# Patient Record
Sex: MALE | Race: WHITE | NOT HISPANIC OR LATINO | URBAN - METROPOLITAN AREA
[De-identification: names, ages, dates, MRNs, and addresses within clinical notes are randomized per-mention and may not be internally consistent; named-entity substitution may affect disease eponyms.]

---
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
## 2021-05-26 ASSESSMENT — PATIENT HEALTH QUESTIONNAIRE - PHQ9: SUM OF ALL RESPONSES TO PHQ QUESTIONS 1-9: 7

## 2021-05-27 ASSESSMENT — PATIENT HEALTH QUESTIONNAIRE - PHQ9: SUM OF ALL RESPONSES TO PHQ QUESTIONS 1-9: 1

## 2021-05-27 NOTE — TELEPHONE ENCOUNTER
RN cannot approve Refill Request    RN can NOT refill this medication med is not covered by policy/route to provider.       Dominique Burleson, [facility] Triage/Med Refill 4/8/2019    Requested Prescriptions   Pending Prescriptions Disp Refills     traZODone (DESYREL) 50 MG tablet [Pharmacy Med Name: TRAZODONE TAB 50MG] 90 tablet 1     Sig: TAKE 1 TABLET AT BEDTIME       Tricyclics/Misc Antidepressant/Antianxiety Meds Refill Protocol Failed - 4/6/2019  7:13 AM        Failed - Age 21 and younger route to prescribing provider     Last office visit with prescriber/PCP: 10/23/2018 Deanne Gao MD OR same dept: 10/23/2018 Deanne Gao MD OR same specialty: 10/23/2018 Deanne Gao MD  Last physical: 7/14/2016 Last MTM visit: Visit date not found   Next visit within 3 mo: Visit date not found  Next physical within 3 mo: Visit date not found  Prescriber OR PCP: Deanne Gao MD  Last diagnosis associated with med order: 1. Insomnia, unspecified type  - traZODone (DESYREL) 50 MG tablet [Pharmacy Med Name: TRAZODONE TAB 50MG]; TAKE 1 TABLET AT BEDTIME  Dispense: 90 tablet; Refill: 1    If protocol passes may refill for 12 months if within 3 months of last provider visit (or a total of 15 months).

## 2021-05-28 NOTE — TELEPHONE ENCOUNTER
This dose was increased from 2 mg to 3 mg at his last visit and a new prescription was sent at that time.

## 2021-05-28 NOTE — TELEPHONE ENCOUNTER
RN cannot approve Refill Request    RN can NOT refill this medication med is not covered by policy/route to provider. Last office visit: 4/23/2019 Deanne Gao MD Last Physical: 7/14/2016 Last MTM visit: Visit date not found Last visit same specialty: 4/23/2019 Deanne aGo MD.  Next visit within 3 mo: Visit date not found  Next physical within 3 mo: Visit date not found      Cortney Oshea, [facility] Triage/Med Refill 5/15/2019    Requested Prescriptions   Pending Prescriptions Disp Refills     guanFACINE 2 mg Tb24 [Pharmacy Med Name: GUANFACIN ER TAB 2MG] 90 tablet 1     Sig: TAKE 1 TABLET DAILY       There is no refill protocol information for this order

## 2021-05-28 NOTE — PROGRESS NOTES
"Name: Forrest Kapadia  Age: 18 y.o.  Gender: male  : 2000  Date of Encounter: 2019    ASSESSMENT/PLAN:  1. Attention deficit hyperactivity disorder (ADHD), combined type  - guanFACINE (INTUNIV) 3 mg Tb24 tablet; Take 1 tablet (3 mg total) by mouth daily.  Dispense: 90 tablet; Refill: 1  - lisdexamfetamine (VYVANSE) 70 MG capsule; Take 1 capsule (70 mg total) by mouth every morning.  Dispense: 30 capsule; Refill: 0    Will increase vyvanse to 70 mg daily and guanfacine ER to 3 mg daily   refills sent (3 months of vyvanse to local pharmacy, 90 days with refill of guanfacine to mail order)  Okay to take trazodone as needed - or discontinue completely  Med check within 6 months    2. Hand injury, right, initial encounter  Suspect soft tissue injury  Discussed x-ray - return if not improving    3. Primary hypertriglyceridemia  Lipid panel improved but still abnormal  [facility] - will await reply        CC: med check, hand injury, followup labs    HPI:  Forrest Kaapdia is a 18 y.o.  male who presents to the clinic for an ADHD medication check. He is accompanied by his grandfather.      ADHD: He doses his vyvanse 60 mg and guanfacine ER 2 mg in the morning. He has not been dosing his afternoon adderall IR 5 mg dose because he has not had a lot of homework. He is in 11th grade. His grades have been declining this trimester. He is failing 3 of his classes and is passing 1 class. He will have to attend summer school if he fails a majority of his classes this semester. He recently had a math test that he did not have time to finish, which caused his grade to drop from an A to an F. He notes that some of his assignments get lost even though he turns them in. He has \"credit recovery\" time after school. He notes that he often finishes his homework at school so that he does not have to do it at home. He struggles with motivation. His grandparents monitor his grades closely online. He is " interested in attending [employer] after high school.     Sleep: He typically does not dose his trazodone. He takes the trazodone if he is not tired when he needs to go to bed. His grandfather notes that he has been sleeping a lot recently. He recently slept for 14 hours without trazodone dosing. He is currently restricted from screens until his grades improve. He is allowed to read before bed.     Hand injury: He slipped on a flight of stairs on 4/18. He hit his right hand on the stairs when he tried to break his fall. He endorses significant swelling immediately after the injury. He localizes the pain to the right fourth knuckle. He is able to use the hand fully. He has been able to work at the [employer] shop despite the injury.      Weight: He takes fish oil and vitamin D supplements. He saw a nutritionist [facility] in [facility]. He does still have a book with some resources from that visit. He drinks one soda every 2-3 days. He eats dinner late at night. Per his grandfather, he returns to the kitchen 5 more times throughout the night.     [place] thinks he spends too long on the toilet. Forrest states he stays in there to have some alone time. He did have 2 bouts of loose stool after eating sushi but usually tolerates that fine. He denies issues with constipation.    The family plans to move next summer to be closer to Forrest's parents.     ROS:  Gen: Positive for fatigue.  MS: Positive for right hand injury. Ankle injury this winter which resolved.  See pertinent positives in the HPI.     PHQ-9 Results:   Little interest or pleasure in doing things: Several days  Feeling down, depressed, or hopeless: Not at all  Trouble falling or staying asleep, or sleeping too much: Several days  Feeling tired or having little energy: More than half the days  Poor appetite or overeating: Several days  Feeling bad about yourself - or that you are a failure or have let yourself or your family down:  "Several days  Trouble concentrating on things, such as reading the newspaper or watching television: Not at all  Moving or speaking so slowly that other people could have noticed. Or the opposite - being so fidgety or restless that you have been moving around a lot more than usual: Not at all  Thoughts that you would be better off dead, or of hurting yourself in some way: Not at all  PHQ-9 Total Score: 6  If you checked off any problems, how difficult have these problems made it for you to do your work, take care of things at home, or get along with other people?: Somewhat difficult  Increased from 4 at his 10/19/18 visit    Past Med / Surg History:  Past Medical History:   Diagnosis Date     Chicken pox      Depression with anxiety 11/8/2016     Fracture     collar bone and finger     Osgood-Schlatter's disease 2014    bilaterally      Osgood-Schlatter's disease of both knees 11/8/2016     Shin splints 2016    bilaterally     Past Surgical History:   Procedure Laterality Date     NO PAST SURGERIES       Fam / Soc History:  Social: He works at a [employer] shop.  Family History   Problem Relation Age of Onset     Allergy (severe) Mother         walnut     Carpal tunnel syndrome Maternal Grandfather      Social History     Social History Narrative    Lives with maternal grandparents. They are his official guardian since February 2016.    His mother is a LPN and his step father is in retail. He has one brother and one sister.       Objective:  Vitals: /72   Pulse 106   Ht 5' 6\" (1.676 m)   Wt (!) 268 lb (121.6 kg)   BMI 43.26 kg/m    Wt Readings from Last 3 Encounters:   10/23/18 (!) 268 lb 1.6 oz (121.6 kg) (>99 %, Z= 2.79)*   04/24/18 (!) 264 lb 4.8 oz (119.9 kg) (>99 %, Z= 2.83)*   10/19/17 (!) 248 lb 14.4 oz (112.9 kg) (>99 %, Z= 2.74)*     * Growth percentiles are based on [facility] (Boys, 2-20 Years) data.     PHYSICAL EXAM:  Gen: Alert, well appearing  Eyes: Conjunctivae clear bilaterally.   Heart: Regular rate " and rhythm; normal S1 and S2; no murmurs, gallops, or rubs.  Lungs: Unlabored respirations; clear breath sounds.  Musculoskeletal: Right hand with bruising at the third MCP joint, mild tenderness with palpation near the fourth MCP joint, normal ROM and strength in right hand and fingers.  Skin: Inflammatory acne on face.   Neuro: Oriented.  Appropriate for age.  Psychiatric: Appropriate affect     Pertinent results / imaging:  Results for orders placed or performed in visit on 04/19/19   Lipid Profile   Result Value Ref Range    Triglycerides 185 (H) <=89 mg/dL    Cholesterol 166 <=169 mg/dL    LDL Calculated 104 <=109 mg/dL    HDL Cholesterol 25 (L) >45 mg/dL    Patient Fasting > 8hrs? Yes    Glucose   Result Value Ref Range    Glucose 89 70 - 125 mg/dL    Patient Fasting > 8hrs? Yes    ALT (SGPT)   Result Value Ref Range    ALT 41 0 - 45 U/L   Glycosylated Hemoglobin A1c   Result Value Ref Range    Hemoglobin A1c 5.3 3.5 - 6.0 %   Vitamin D, Total (25-Hydroxy)   Result Value Ref Range    Vitamin D, Total (25-Hydroxy) 35.6 30.0 - 80.0 ng/mL       DATA REVIEWED:  Additional History from Old Records Summarized (2): Reviewed 1/18/19 note regarding ankle injury.   Decision to Obtain Records (1): None  Radiology Tests Summarized or Ordered (1): Reviewed 1/18/19 ankle x-ray, which was normal.   Labs Reviewed or Ordered (1): Reviewed 4/19/19 labs.   Medicine Test Summarized or Ordered (1): Administered PHQ-9 today.   Independent Review of EKG, X-RAY, or RAPID STREP (2 each): None    The visit lasted a total of 32 minutes face to face with the patient. Over 50% of the time was spent counseling and educating the patient about his ADHD medication.    I, Amber Cardona, am scribing for and in the presence of, Dr. Deanne Gao.    I, Dr. Gao, personally performed the services described in this documentation, as scribed by Amber Cardona in my presence, and it is both accurate and complete.    Total Data Points: 5.      Deanne Gao MD  4/23/2019

## 2021-05-30 VITALS — BODY MASS INDEX: 40.65 KG/M2 | WEIGHT: 244 LBS | HEIGHT: 65 IN

## 2021-05-30 VITALS — WEIGHT: 238 LBS | HEIGHT: 65 IN | BODY MASS INDEX: 39.65 KG/M2

## 2021-05-30 NOTE — TELEPHONE ENCOUNTER
RN cannot approve Refill Request    RN can NOT refill this medication med is not covered by policy/route to provider. Last office visit: 4/23/2019 Deanne Gao MD Last Physical: 7/14/2016 Last MTM visit: Visit date not found Last visit same specialty: 4/23/2019 Deanne Gao MD.  Next visit within 3 mo: Visit date not found  Next physical within 3 mo: Visit date not found      Nory Parker, [facility] Triage/Med Refill 7/21/2019    Requested Prescriptions   Pending Prescriptions Disp Refills     lisdexamfetamine (VYVANSE) 70 MG capsule 30 capsule 0     Sig: Take 1 capsule (70 mg total) by mouth every morning.       There is no refill protocol information for this order

## 2021-05-31 VITALS — HEIGHT: 67 IN | WEIGHT: 248.9 LBS | BODY MASS INDEX: 39.07 KG/M2

## 2021-05-31 VITALS — WEIGHT: 250.1 LBS

## 2021-05-31 VITALS — WEIGHT: 249 LBS

## 2021-06-01 VITALS — BODY MASS INDEX: 42.47 KG/M2 | HEIGHT: 66 IN | WEIGHT: 264.3 LBS

## 2021-06-02 VITALS — BODY MASS INDEX: 43.07 KG/M2 | HEIGHT: 66 IN | WEIGHT: 268 LBS

## 2021-06-02 VITALS — HEIGHT: 66 IN | BODY MASS INDEX: 43.09 KG/M2 | WEIGHT: 268.1 LBS

## 2021-06-02 NOTE — TELEPHONE ENCOUNTER
RN cannot approve Refill Request    RN can NOT refill this medication med is not covered by policy/route to provider. Last office visit: 4/23/2019 Deanne Gao MD Last Physical: 7/14/2016 Last MTM visit: Visit date not found Last visit same specialty: 4/23/2019 Deanne Gao MD.  Next visit within 3 mo: Visit date not found  Next physical within 3 mo: Visit date not found      Nory Parker, [facility] Triage/Med Refill 10/20/2019    Requested Prescriptions   Pending Prescriptions Disp Refills     lisdexamfetamine (VYVANSE) 70 MG capsule 30 capsule 0     Sig: Take 1 capsule (70 mg total) by mouth every morning.       There is no refill protocol information for this order

## 2021-06-02 NOTE — PATIENT INSTRUCTIONS - HE
Vyvanse refills - 11/20, 12/20 and 1/19 for next fills  30 days of Adderall IR 5 mg sent to mail order   Guanfacine sent to mail order   Call/message when refills needed    Med check within 6 months   No

## 2021-06-02 NOTE — PROGRESS NOTES
ASSESSMENT:  1. Attention deficit hyperactivity disorder (ADHD), combined type  - lisdexamfetamine (VYVANSE) 70 MG capsule; Take 1 capsule (70 mg total) by mouth every morning.  Dispense: 30 capsule; Refill: 0  - guanFACINE (INTUNIV) 3 mg Tb24 tablet; TAKE 1 TABLET DAILY  Dispense: 90 tablet; Refill: 0  - dextroamphetamine-amphetamine (ADDERALL) 5 mg Tab tablet; Take 1 tablet by mouth daily in mid afternoon as needed  Dispense: 30 tablet; Refill: 0  - med check within 6 months    3. Depressed mood  - reviewed options of therapy/medication  - Forrest is not interested in further therapy - said he would return if symptoms worsening      PLAN:  Patient Instructions   Vyvanse refills - 11/20, 12/20 and 1/19 for next fills  30 days of Adderall IR 5 mg sent to mail order   Guanfacine sent to mail order   Call/message when refills needed    Med check within 6 months          CHIEF COMPLAINT:  Chief Complaint   Patient presents with     ADHD     vyvanse - [facility] / all other medications mail order       HISTORY OF PRESENT ILLNESS:  Forrest is a 19 y.o. male presenting to the clinic today for a medication check-up. He was last seen in clinic on 04/23/2019.    Medication: When he was last seen in clinic 6 months ago, he was not doing well in school.   He failed a few classes of high school and took summer school to make up for them. We increased his Vyvnase to 70 mg from 60 mg  and increased his Guanfacine ER to 3 mg from 2 mg daily. He complains of no side effects or issues with this increase in medication. He has not had his Adderall IR 5 mg prescription filled in [facility]. He would like to take this again for after school homework. This year he is doing better in school. He is in 12th grade. He is scheduled to graduate on track.     He works at a [employer] place about 25 hours a week. He saves his money. His grandparents want to move, but he said he does not want to move. He wants to live by [place]. He wants to  "go to [employer].     He does not take the Trazodone for sleep. He no longer takes anything for sleep.     Depression: He has cut on purpose  in the past. He uses this for a relief of stress and anxiety. He feels his mood is \"okay\". He has had a little depression lately with his past situations. He says, he rarely has issues with new stuff and problems. He said 1 in 100 times his bad thoughts are about new issues. It is mostly old stuff that comes up again. He has mood swings he has always had, but they have been more often lately. He picks at his skin when he is anxious. He has never been on a medication for mood. He feels he can cope with his mood \"fine\". He does not want to try a therapist. He was in therapy for years including group home for 3 year. He feels he has social support at school or work.   Some of his friends vape, but he does not. He would like to continue to try to cope on his own. He will come in again if he feels it worsens or he can no longer handle his mood on his own.     Little interest or pleasure in doing things: More than half the days  Feeling down, depressed, or hopeless: More than half the days  Trouble falling or staying asleep, or sleeping too much: Not at all  Feeling tired or having little energy: Not at all  Poor appetite or overeating: Not at all  Feeling bad about yourself - or that you are a failure or have let yourself or your family down: Several days  Trouble concentrating on things, such as reading the newspaper or watching television: Not at all  Moving or speaking so slowly that other people could have noticed. Or the opposite - being so fidgety or restless that you have been moving around a lot more than usual: Several days  Thoughts that you would be better off dead, or of hurting yourself in some way: Several days  PHQ-9 Total Score: 7 - increased from 6      Past Medical History:   Diagnosis Date     Chicken pox      Depression with anxiety 11/8/2016     Fracture  " "   collar bone and finger     Osgood-Schlatter's disease 2014    bilaterally      Osgood-Schlatter's disease of both knees 11/8/2016     Shin splints 2016    bilaterally       Family History   Problem Relation Age of Onset     Allergy (severe) Mother         walnut     Carpal tunnel syndrome Maternal Grandfather        Past Surgical History:   Procedure Laterality Date     NO PAST SURGERIES             TOBACCO USE:  Social History     Tobacco Use   Smoking Status Never Smoker   Smokeless Tobacco Never Used     VITALS:  Vitals:    10/28/19 1600   BP: 116/78   Patient Site: Right Arm   Patient Position: Sitting   Cuff Size: Adult Large   Pulse: 93   SpO2: 97%   Weight: (!) 270 lb 9.6 oz (122.7 kg)   Height: 5' 6.14\" (1.68 m)     Wt Readings from Last 3 Encounters:   10/28/19 (!) 270 lb 9.6 oz (122.7 kg) (>99 %, Z= 2.72)*   04/23/19 (!) 268 lb (121.6 kg) (>99 %, Z= 2.72)*   10/23/18 (!) 268 lb 1.6 oz (121.6 kg) (>99 %, Z= 2.79)*     * Growth percentiles are based on CDC (Boys, 2-20 Years) data.     Body mass index is 43.49 kg/m .    PHYSICAL EXAM:  General: Alert, no acute distress. obese  Eyes: PERRL, EOMI, Conjunctivae clear.  Lungs: Clear to auscultation bilaterally. No wheezes, rhonchi, or rales. No prolongation of expiratory phase. No tachypnea, retractions, or increased work of breathing.  Cardiac: Regular rate and rhythm, no murmur audible..  Skin: Mild Inflammatory acne on face. Excoriated papules on arms. Linear cut scar on dorsal of hand.    ADDITIONAL HISTORY SUMMARIZED (2): None.  DECISION TO OBTAIN EXTRA INFORMATION (1): None.   RADIOLOGY TESTS (1): None.  LABS (1): reviewed last lipid panel.  MEDICINE TESTS (1): PHQ-9  INDEPENDENT REVIEW (2 each): None.     QUALITY MEASURES:  The following are part of a depression follow up plan for the patient:  patient follow-up to return when and if necessary    The visit lasted a total of 22 minutes that I spent face to face with the patient. Over 50% of the time was " spent counseling and educating the patient about ADHD/mood    I, Luisana Bucio, am scribing for and in the presence of, Dr. Gao.    I, Dr. Gao, personally performed the services described in this documentation, as scribed by Luisana Bucio in my presence, and it is both accurate and complete.    MEDICATIONS:  Current Outpatient Medications   Medication Sig Dispense Refill     dextrin (FIBER, DEXTRIN,) 3 gram/3.5 gram Powd Take 1 capsule by mouth.       guanFACINE (INTUNIV) 3 mg Tb24 tablet TAKE 1 TABLET DAILY 90 tablet 0     lisdexamfetamine (VYVANSE) 70 MG capsule Take 1 capsule (70 mg total) by mouth every morning. 30 capsule 0     omega 3-dha-epa-fish oil (FISH OIL) 360-1,200 mg CpDR Take 1 capsule by mouth.       dextroamphetamine-amphetamine (ADDERALL) 5 mg Tab tablet Take 1 tablet by mouth daily in mid afternoon as needed 90 tablet 0     No current facility-administered medications for this visit.        Total data points:2

## 2021-06-02 NOTE — TELEPHONE ENCOUNTER
RN cannot approve Refill Request    RN can NOT refill this medication med is not covered by policy/route to provider. Last office visit: 4/23/2019 Deanne Gao MD Last Physical: 7/14/2016 Last MTM visit: Visit date not found Last visit same specialty: 4/23/2019 Deanne Gao MD.  Next visit within 3 mo: Visit date not found  Next physical within 3 mo: Visit date not found      Kim Devries, [facility] Triage/Med Refill 10/9/2019    Requested Prescriptions   Pending Prescriptions Disp Refills     guanFACINE (INTUNIV) 3 mg Tb24 tablet [Pharmacy Med Name: GUANFACIN ER TAB 3MG] 90 tablet 1     Sig: TAKE 1 TABLET DAILY       There is no refill protocol information for this order

## 2021-06-03 VITALS
OXYGEN SATURATION: 97 % | DIASTOLIC BLOOD PRESSURE: 80 MMHG | TEMPERATURE: 97 F | SYSTOLIC BLOOD PRESSURE: 120 MMHG | WEIGHT: 274 LBS | RESPIRATION RATE: 19 BRPM | HEART RATE: 94 BPM | BODY MASS INDEX: 44.04 KG/M2

## 2021-06-03 VITALS
HEART RATE: 93 BPM | DIASTOLIC BLOOD PRESSURE: 78 MMHG | HEIGHT: 66 IN | BODY MASS INDEX: 43.49 KG/M2 | SYSTOLIC BLOOD PRESSURE: 116 MMHG | WEIGHT: 270.6 LBS | OXYGEN SATURATION: 97 %

## 2021-06-03 VITALS
HEART RATE: 88 BPM | RESPIRATION RATE: 20 BRPM | HEIGHT: 67 IN | BODY MASS INDEX: 42.58 KG/M2 | SYSTOLIC BLOOD PRESSURE: 118 MMHG | TEMPERATURE: 98 F | WEIGHT: 271.3 LBS | OXYGEN SATURATION: 97 % | DIASTOLIC BLOOD PRESSURE: 76 MMHG

## 2021-06-04 NOTE — PROGRESS NOTES
Preoperative Exam    Scheduled Procedure: Right Knee ACL Repair   Surgery Date:  12/24/2019  Surgery Location: [facility], [place], operated by [facility]   Surgeon:  Dr. Cantu     Assessment/Plan:     1. Preoperative examination  2. Rupture of anterior cruciate ligament of right knee, subsequent encounter  - Electrocardiogram Perform and Read - due to history of hyperlipidemia           Surgical Procedure Risk: Low (reported cardiac risk generally < 1%)  Have you had prior anesthesia?: No  Have you or any family members had a previous anesthesia reaction: No  Do you or any family members have a history of a clotting or bleeding disorder?:  No    APPROVAL GIVEN to proceed with proposed procedure  Normal EKG     Functional Status: Age Appropriate [place]  Patient plans to recover at home with family.  Do you have any concerns regarding care after surgery?: No     Subjective:      Forrest Kapadia is a 19 y.o. male who presents for a preoperative consultation. The exam is requested by Dr. Cantu in preparation for Right Knee ACL Repair  to be performed at [facility], [place], operated by [facility] on 12/24/2019. Today s examination on 12/23/2019 is done to review the underlying surgical condition of high grade right ACL tear, clear for anesthesia, and review medical problems with appropriate changes in medications.     Forrest sustained injury to his right knee at school playing basketball on 12/6. He reports a hyperextension injury of the right knee and hearing a pop. He was seen in a walk-in clinic later that day with negative knee x-ray and was given a referral to orthopedics. His MRI showed a right high grade ACL tear.  The patient states that there is soreness around his right knee area as well as swelling.     PFSH:  He plays basketball.  His Uncle Nikhil, not blood related, has history of torn ACL.     ROS:  Gen - healthy  neuro: The patient reports staying up until midnight last night. grandpa states that the patient has always had problems with  sleeping. [place] reports that the patient stays up all night and ends up falling asleep on the living room chair until 4 PM. Additionally, the patient says the  Trazodone makes him feel like a zombie, but states that he does not sleep after taking the medication.   Skin: The patient has no concerns other than acne.  All other systems reviewed and are negative, other than those listed in the HPI.    Pertinent History  Any croup, wheezing or respiratory illness in the past 3 weeks?:  No  History of obstructive sleep apnea: No  Steroid use in the last 6 months: No  Any ibuprofen, NSAID or aspirin use in the last 2 weeks?: Yes: Ibuprofen 2 days ago   Prior Blood Transfusion: No  Prior Blood Transfusion Reaction: No  If for some reason prior to, during or after the procedure, if it is medically indicated, would you be willing to have a blood transfusion?:  There is no transfusion refusal.  Any exposure in the past 3 weeks to chicken pox, Fifth disease, whooping cough, measles, tuberculosis?: No    Current Outpatient Medications   Medication Sig Dispense Refill     cholecalciferol, vitamin D3, 1,000 unit (25 mcg) tablet Take 1,000 Units by mouth daily.       dextrin (FIBER, DEXTRIN,) 3 gram/3.5 gram Powd Take 1 capsule by mouth.       dextroamphetamine-amphetamine (ADDERALL) 5 mg Tab tablet Take 1 tablet by mouth daily in mid afternoon as needed 30 tablet 0     guanFACINE (INTUNIV) 3 mg Tb24 tablet TAKE 1 TABLET DAILY 90 tablet 0     [START ON 1/19/2020] lisdexamfetamine (VYVANSE) 70 MG capsule Take 1 capsule (70 mg total) by mouth every morning. 30 capsule 0     omega 3-dha-epa-fish oil (FISH OIL) 360-1,200 mg CpDR Take 1 capsule by mouth.       No current facility-administered medications for this visit.         Allergies   Allergen Reactions     Amoxicillin Nausea And Vomiting and Rash       Patient Active Problem List   Diagnosis     Attention deficit hyperactivity disorder (ADHD), combined type     Primary  hypertriglyceridemia     Obesity     Right ACL tear       Past Medical History:   Diagnosis Date     Chicken pox      Depression with anxiety 11/8/2016     Fracture     collar bone and finger     Osgood-Schlatter's disease 2014    bilaterally      Osgood-Schlatter's disease of both knees 11/8/2016     Shin splints 2016    bilaterally       Past Surgical History:   Procedure Laterality Date     NO PAST SURGERIES         Social History     Socioeconomic History     Marital status: Single     Spouse name: Not on file     Number of children: Not on file     Years of education: Not on file     Highest education level: Not on file   Occupational History     Not on file   Social Needs     Financial resource strain: Not on file     Food insecurity:     Worry: Not on file     Inability: Not on file     Transportation needs:     Medical: Not on file     Non-medical: Not on file   Tobacco Use     Smoking status: Never Smoker     Smokeless tobacco: Never Used   Substance and Sexual Activity     Alcohol use: Not on file     Drug use: Not on file     Sexual activity: Not on file   Lifestyle     Physical activity:     Days per week: Not on file     Minutes per session: Not on file     Stress: Not on file   Relationships     Social connections:     Talks on phone: Not on file     Gets together: Not on file     Attends Congregation service: Not on file     Active member of club or organization: Not on file     Attends meetings of clubs or organizations: Not on file     Relationship status: Not on file     Intimate partner violence:     Fear of current or ex partner: Not on file     Emotionally abused: Not on file     Physically abused: Not on file     Forced sexual activity: Not on file   Other Topics Concern     Not on file   Social History Narrative    Lives with maternal grandparents. They are his official guardian since February 2016.    His mother is a LPN and his step father is in retail. He has one brother and one sister.  "        Objective:     Vitals:    12/23/19 1008   BP: 125/83   Pulse: 88   Resp: 20   Temp: 98  F (36.7  C)   TempSrc: Oral   SpO2: 97%   Weight: (!) 271 lb 4.8 oz (123.1 kg)   Height: 5' 6.54\" (1.69 m)       Physical Exam:  Constitutional: He appears well-developed and well-nourished.   HEENT: Head: Normocephalic.    Right Ear: Tympanic membrane, external ear and canal normal.    Left Ear: Tympanic membrane, external ear and canal normal.    Nose: Nose normal.    Mouth/Throat: Mucous membranes are moist. Oropharynx is clear.    Eyes: Conjunctivae and lids are normal. Pupils are equal, round, and reactive to light.   Neck: Neck supple. No tenderness is present.   Cardiovascular: Normal rate and regular rhythm. No murmur heard.  Pulmonary/Chest: Effort normal and breath sounds normal. There is normal air entry.   Abdominal: Soft. There is no hepatosplenomegaly.   Musculoskeletal: Right knee some lateral swelling, slight limp with walking.   Neurological: He is alert and oriented.  Psychiatric: He has a normal mood and affect. His speech is normal and behavior is normal.  Skin: Mild inflammatory acne      Labs:  No labs were ordered during this visit    Immunization History   Administered Date(s) Administered     DTaP, historic 2001, 2001, 2001, 09/06/2002, 11/02/2005     HPV 9 Valent 07/14/2016, 09/19/2016, 01/20/2017     Hep A, historic 10/31/2006, 05/16/2007     Hep B, historic 2001, 2001, 2001     HiB, historic,unspecified 2001, 2001, 2001, 09/06/2002     IPV 2001, 2001, 06/07/2002, 11/02/2005     Influenza, inj, historic,unspecified 11/08/2002, 12/06/2002, 10/28/2004, 11/02/2005, 10/31/2006, 11/19/2008, 10/15/2009, 10/29/2010     Influenza,seasonal quad, PF, =/> 6months 10/19/2017     Influenza,seasonal,quad inj =/> 6months 10/15/2019     MMR 06/07/2002, 11/02/2005     Meningococcal MCV4P 07/14/2016, 10/19/2017     Pneumo Conj 7-V(before 2010) " 2001, 2001, 2001, 09/06/2002     Td,adult,historic,unspecified 06/28/2012     Tdap 08/22/2014     Varicella 06/07/2002, 10/31/2006         ADDITIONAL HISTORY SUMMARIZED (2): 12/06/2019 Office visit note by Dr. Rouse reviewed.   DECISION TO OBTAIN EXTRA INFORMATION (1): None.   RADIOLOGY TESTS (1): reviewed knee x-ray 12/6/19  LABS (1): last lipid panel reviewed  MEDICINE TESTS (1): EKG ordered today.   INDEPENDENT REVIEW (2 each): reading of EKG    The visit lasted a total of 20 minutes face to face with the patient. Over 50% of the time was spent counseling and educating the patient about pre-op surgery for Right Knee ACL Repair.    I, Claudia Hutchison am scribing for and in the presence of, Dr. Gao.    I, Dr. Gao, personally performed the services described in this documentation, as scribed by Claudia Hutchison in my presence, and it is both accurate and complete.    Total data points: 7

## 2021-06-06 NOTE — TELEPHONE ENCOUNTER
Controlled Substance Refill Request  Medication Name:  lisdexamfetamine (VYVANSE) 70 MG capsule 30 capsule 0 1/19/2020     Sig - Route: Take 1 capsule (70 mg total) by mouth every morning. - Oral    Sent to pharmacy as: lisdexamfetamine 70 mg capsule (VYVANSE)    Earliest Fill Date: 1/19/2020    Notes to Pharmacy: Rx 3 of 3    E-Prescribing Status: Receipt confirmed by pharmacy (10/28/2019  5:07 PM CDT      Also is requesting a 30 month supply if this is able to be done.    Date Last Fill: 1/19/20  Requested Pharmacy: [id number]  Submit electronically to pharmacy  Controlled Substance Agreement on file:   Encounter-Level CSA Scan Date - 10/19/2017:    Scan on 10/23/2017 11:58 AM        Last office visit:  12/23/19

## 2021-06-06 NOTE — TELEPHONE ENCOUNTER
RN cannot approve Refill Request    RN can NOT refill this medication med is not covered by policy/route to provider. Last office visit: 10/28/2019 Deanne Gao MD Last Physical: 12/23/2019 Last MTM visit: Visit date not found Last visit same specialty: 10/28/2019 Deanne Gao MD.  Next visit within 3 mo: Visit date not found  Next physical within 3 mo: Visit date not found      Kim Devries, [facility] Connection Triage/Med Refill 2/13/2020    Requested Prescriptions   Pending Prescriptions Disp Refills     traZODone (DESYREL) 50 MG tablet [Pharmacy Med Name: TRAZODONE 50 MG TABLET] 90 tablet 1     Sig: TAKE 1/2 - 1 TABLET NIGHTLY AS NEEDED FOR SLEEP       Tricyclics/Misc Antidepressant/Antianxiety Meds Refill Protocol Failed - 2/9/2020  9:47 AM        Failed - Age 21 and younger route to prescribing provider     Last office visit with prescriber/PCP: 10/28/2019 Deanne Gao MD OR same dept: 10/28/2019 Deanne Gao MD OR same specialty: 10/28/2019 Deanne Gao MD  Last physical: 12/23/2019 Last MTM visit: Visit date not found   Next visit within 3 mo: Visit date not found  Next physical within 3 mo: Visit date not found  Prescriber OR PCP: Deanne Gao MD  Last diagnosis associated with med order: 1. Insomnia, unspecified type  - traZODone (DESYREL) 50 MG tablet [Pharmacy Med Name: TRAZODONE 50 MG TABLET]; Take 1/2 - 1 tablet nightly as needed for sleep  Dispense: 90 tablet; Refill: 1    If protocol passes may refill for 12 months if within 3 months of last provider visit (or a total of 15 months).

## 2021-06-06 NOTE — TELEPHONE ENCOUNTER
Called and was not able to leave VM, Mail box is full.     If patient calls back please give message below.     Noemí Hyde CMA  5:08 PM  2/18/2020

## 2021-06-07 NOTE — PROGRESS NOTES
"Forrest Kapadia is a 19 y.o. male who is being evaluated via a billable telephone visit.      The patient has been notified of following:     \"This telephone visit will be conducted via a call between you and your physician/provider. We have found that certain health care needs can be provided without the need for a physical exam.  This service lets us provide the care you need with a short phone conversation.  If a prescription is necessary we can send it directly to your pharmacy.  If lab work is needed we can place an order for that and you can then stop by our lab to have the test done at a later time.    Telephone visits are billed at different rates depending on your insurance coverage. During this emergency period, for some insurers they may be billed the same as an in-person visit.  Please reach out to your insurance provider with any questions.    If during the course of the call the physician/provider feels a telephone visit is not appropriate, you will not be charged for this service.\"    Patient has given verbal consent to a Telephone visit? Yes    Patient would like to receive their AVS by AVS Preference: Elena.    Additional provider notes:     Forrest states he is doing well on current medications. Has been on Vyvnase 70 mg and guanfacine ER 3 mg since last April without change. He is now on-line home schooling due to COVID-19 and adjusting to that. He is passing his classes and set to graduate from high school this summer. He is not needing the IR Adderall 5 mg dose. He is also not using trazodone for sleep anymore. Denies mood concerns.    He is moving to [place] this summer. Relatives live in that area. Grandparents are moving away from the [facility] as well. 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 He is recovering well from ACL surgery this winter - is out of PT.    Little interest or pleasure in doing things: Not at all  Feeling down, depressed, " or hopeless: Not at all  Trouble falling or staying asleep, or sleeping too much: Not at all  Feeling tired or having little energy: Not at all  Poor appetite or overeating: Several days  Feeling bad about yourself - or that you are a failure or have let yourself or your family down: Not at all  Trouble concentrating on things, such as reading the newspaper or watching television: Not at all  Moving or speaking so slowly that other people could have noticed. Or the opposite - being so fidgety or restless that you have been moving around a lot more than usual: Not at all  Thoughts that you would be better off dead, or of hurting yourself in some way: Not at all  PHQ-9 Total Score: 1  If you checked off any problems, how difficult have these problems made it for you to do your work, take care of things at home, or get along with other people?: Not difficult at all    Assessment/Plan:  1. Attention deficit hyperactivity disorder (ADHD), combined type  - lisdexamfetamine (VYVANSE) 70 MG capsule; Take 1 capsule (70 mg total) by mouth daily.  Dispense: 30 capsule; Refill: 0. Fill date 5/16/20  - guanFACINE (INTUNIV) 3 mg Tb24 tablet; Take 1 tablet (3 mg total) by mouth daily.  Dispense: 90 tablet; Refill: 0-sent to mail order pharmacy    Continue current doses  Sent 1 month only of Vyvanse to local pharmacy due to upcoming move in June  Let us know where additional refills should be sent for later this summer  Discussed transition of care if moving away from [facility] - would need med check in 6 months        Phone call duration:  9:08 minutes

## 2021-06-07 NOTE — PATIENT INSTRUCTIONS - HE
1. Attention deficit hyperactivity disorder (ADHD), combined type  - lisdexamfetamine (VYVANSE) 70 MG capsule; Take 1 capsule (70 mg total) by mouth daily.  Dispense: 30 capsule; Refill: 0. Fill date 5/16/20  - guanFACINE (INTUNIV) 3 mg Tb24 tablet; Take 1 tablet (3 mg total) by mouth daily.  Dispense: 90 tablet; Refill: 0-sent to mail order pharmacy    Continue current doses  Sent 1 month only of Vyvanse to local pharmacy due to upcoming move in June  Let us know where additional refills should be sent for later this summer  Discussed transition of care if moving away from [facility] - would need med check in 6 months

## 2021-06-07 NOTE — TELEPHONE ENCOUNTER
RN cannot approve Refill Request    RN can NOT refill this medication med is not covered by policy/route to provider     . Last office visit: 10/28/2019 Deanne Gao MD Last Physical: 12/23/2019 Last MTM visit: Visit date not found Last visit same specialty: 10/28/2019 Deanne Gao MD.  Next visit within 3 mo: Visit date not found  Next physical within 3 mo: Visit date not found      Dominique Burleson, [facility] Connection Triage/Med Refill 4/7/2020    Requested Prescriptions   Pending Prescriptions Disp Refills     guanFACINE (INTUNIV) 3 mg Tb24 tablet [Pharmacy Med Name: GUANFACIN ER TAB 3MG] 90 tablet 0     Sig: TAKE 1 TABLET DAILY       There is no refill protocol information for this order

## 2021-06-08 NOTE — TELEPHONE ENCOUNTER
Spoke with grandparents regarding medication. They stated they cant fill rx her early and unable to transfer. They want two prescriptions sent to the [facility] in [place]. I did state they may not fill due to a [place] provider they still want the medication sent.

## 2021-06-08 NOTE — TELEPHONE ENCOUNTER
Medication Question or Clarification  Who is calling: Grandmother  What medication are you calling about (include dose and sig)?:   lisdexamfetamine (VYVANSE) 70 MG capsule  30 capsule  0  6/15/2020      Sig - Route: Take 1 capsule (70 mg total) by mouth daily. - Oral     Sent to pharmacy as: lisdexamfetamine 70 mg capsule (VYVANSE)     Earliest Fill Date: 6/15/2020     E-Prescribing Status: Receipt confirmed by pharmacy (4/22/2020  3:17 PM CDT)        Who prescribed the medication?: Deanne Gao MD      What is your question/concern?: The patient will be leaving tomorrow for [place], where he will be moving permanently.  Grandmother is asking if PCP would be willing to write a prescription for them to carry with to [place] to fill until they have gotten established with a new provider there.  Please advise. Grandmother would come to clinic to  today.  Requested Pharmacy: NA  Okay to leave a detailed message?: Yes

## 2021-06-08 NOTE — TELEPHONE ENCOUNTER
I sent the Idalmis 15 Rx to the [facility] in [place].   They said they can probably fill it, depending on his insurance.   He is due for med check in the fall.   They should try to find a prescriber in [place] before then.

## 2021-06-08 NOTE — PROGRESS NOTES
, your patient is coming in on Friday 1/20 on [facility] schedule requesting for the last dose of HPV . Please enter appropriate order for patient. Thank you      Ana Montilla, JESSICA 1/5/2017 3:07 PM

## 2021-06-08 NOTE — TELEPHONE ENCOUNTER
Patient Returning Call  Reason for call:  Ian called back.  Information relayed to patient:  Informed of Dr Zapata's message again.  Ian states the pharmacy needs a new Rx. Still wanted this [place]  Please advise.  Patient has additional questions:  Yes  If YES, what are your questions/concerns:  As above  Okay to leave a detailed message?: Yes

## 2021-06-08 NOTE — TELEPHONE ENCOUNTER
He already has a prescription on file at his pharmacy, 3 of 3, dated 6/15.   They might be able to get that one filled, might need override from their insurance.   I am pretty certain that a pharmacy in [place] will not fill an Rx for a controlled substance from a [place] provider.   The other option, if they cannot get it filled today,  would be for him to have someone  the rx here in a week or so and mail it to him.

## 2021-06-08 NOTE — TELEPHONE ENCOUNTER
Spoke with Ian regarding  note. GrandMother stated there is a pharmacy right across the board[place] he could use until he finds a new provider. She will try to see if they can fill the prescription at the pharmacy if not she will check to see if they can [facility] [place] [facility].

## 2021-06-08 NOTE — TELEPHONE ENCOUNTER
Spoke with grandparent to let her know that we have sent the medication. We also stated he should try to find a prescriber in [place] before the fall since he is due in the fall for a med check. She understood.

## 2021-06-09 NOTE — PROGRESS NOTES
"Name: Forrest Kapadia  Age: 16 y.o.  Gender: male  : 2000  Date of Encounter: 2017    ASSESSMENT/PLAN:  1. Left wrist pain - possible carpel tunnel  - Wrist Brace w/o Kendy Rob - advised. Family declined - Heather says he has wrist braces at home they will try  - hand-out on carpel tunnel provided - if not improving, OT evalution    2. ADHD (attention deficit hyperactivity disorder), poorly controlled  - lisdexamfetamine (VYVANSE) 60 MG capsule; Take 1 capsule (60 mg total) by mouth every morning.  Dispense: 30 capsule; Refill: 0  - will increase vyvanse to 60 mg daily from 40 mg - call if side effects noted  - begin afternoon dose of guanfacine again - morning and afternoon - this dose can likely be increased or change over to long-acting medication  - adderall 5 mg prn in afternoon - rarely using- no refills needed  - followup in 1 month - also needs fasting lipids for cardiology f/u      Orders Placed This Encounter   Procedures     Lipid Profile     Standing Status:   Future     Standing Expiration Date:   2017     Order Specific Question:   Fasting is required?     Answer:   Yes       Chief Complaint   Patient presents with     ADHD     Wrist Pain     left wrist, intermittent pain, has a hard time picking up anything heavy, no injury that he knows of     \"excessive bathroom time\"     denies problems urinating and constipation       HPI:  Forrest Kapadia is a 16 y.o.  male who presents to the clinic, accompanied by heather, complaining of left wrist pain. His pain has been present for 2-3 weeks. He has a hard time picking up heavy objects. He denies incident of injury. He is right hand dominant. He does work in a [employer] shop, 4 hours on Saturday/ each. 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 He denies a lot of keyboarding or texting. He does have some numbness and tingling at times and pain can extend into forearm.    He also presents to follow up on his ADHD. He is " "currently taking 40 MG of Vyvanse daily, 1 MG of guanfacine in the morning, and 5 MG of Adderall in the afternoon as needed. His is not doing well academically. He is currently earning all D's and F's. He has not been completing his homework and he has had poor achievement on examinations. He has difficulty paying attention in class. He has an IEP. He has discontinued doing additional tutoring in the mornings, because he often was skipping tutoring sessions.  He discontinued seeing his therapist in August 2016.     ROS:  Gen: Healthy  MS: See HPI.   Neuro: He denies difficulty sleeping at night with taking 50 MG of trazodone.    Past Med / Surg History:  Past Medical History:   Diagnosis Date     Chicken pox      Fracture     collar bone and finger     Osgood-Schlatter's disease 2014    bilaterally      Shin splints 2016    bilaterally       Fam / Soc History:  He works at a [employer] place on the weekends.   8th grader    Family History   Problem Relation Age of Onset     Allergy (severe) Mother      walnut     Carpal tunnel syndrome Maternal Grandfather      Social History     Social History Narrative    Lives with maternal grandparents. They are his official guardian since February 2016.    His mother is a LPN and his step father is in retail. He has one brother and one sister.       Objective:  Vitals:   Visit Vitals     /70 (Patient Site: Right Arm, Patient Position: Sitting, Cuff Size: Adult Large)     Pulse 80     Ht 5' 5.25\" (1.657 m)     Wt (!) 238 lb (108 kg)     BMI 39.3 kg/m2     Wt Readings from Last 3 Encounters:   02/27/17 (!) 238 lb (108 kg) (>99 %, Z= 2.73)*   11/28/16 (!) 226 lb (102.5 kg) (>99 %, Z= 2.60)*   07/14/16 (!) 219 lb 9.6 oz (99.6 kg) (>99 %, Z= 2.58)*     * Growth percentiles are based on CDC 2-20 Years data.     PHYSICAL EXAM:  Gen: Alert, well appearing  Eyes: Conjunctivae clear bilaterally. Glasses.   Heart: Regular rate and rhythm; normal S1 and S2; no murmurs, gallops, or " rubs.  Lungs: Unlabored respirations; clear breath sounds.  Musculoskeletal: Left wrist with mild tenderness to palpation. Somewhat restricted ROM. Some numbness appreciated with wrist flexion.   Skin: Inflammatory acne.   Neuro: Oriented. Somewhat fidgety in chair.   Psychiatric: Appropriate affect        DATA REVIEWED: 3 data points  Additional History from Old Records Summarized (2): Reviewed [facility] from [facility], 10/19/2016.  Decision to Obtain Records (1): None  Radiology Tests Summarized or Ordered (1): None  Labs Reviewed or Ordered (1): Ordered labs.   Medicine Test Summarized or Ordered (1): None  Independent Review of EKG, X-RAY, or RAPID STREP (2 each): None    The visit lasted a total of 27 minutes face to face with the patient. Over 50% of the time was spent counseling and educating the patient about ADHD.    I, Stephani Daley, am scribing for and in the presence of, Dr. Gao.    I, Dr. Gao, personally performed the services described in this documentation, as scribed by Stephani Daley in my presence, and it is both accurate and complete.    Deanne Gao MD  2/27/2017

## 2021-06-10 NOTE — PROGRESS NOTES
Name: Forrest Kapadia  Age: 16 y.o.  Gender: male  : 2000  Date of Encounter: 2017    ASSESSMENT/PLAN:  1. ADHD (attention deficit hyperactivity disorder) - slightly improved  - lisdexamfetamine (VYVANSE) 60 MG capsule; Take 1 capsule (60 mg total) by mouth every morning.  Dispense: 30 capsule; Refill: 0    Will continue current Vyvanse dose 60 mg daily - faxed 3 months - can increase to 70 mg if needed  Encouraged guanfacine 1 mg qam AND afternoon - this could be switched over to long-acting if desired  Adderall 5 mg afternoon as needed - he rarely takes this  Med check within 6 months - advised [facility] this summer  Reminded about fasting lipid panel      Chief Complaint   Patient presents with     ADHD     things were going really well up until two weeks when they went on vacation       HPI:  Forrest Kapadia is a 16 y.o.  male who presents to the clinic, accompanied by grandma, to follow up on his ADHD. He is currently taking 60 MG of Vyvanse every morning, 1 MG of guanfacine in the morning. He rarely takes 5 MG of Adderall in the afternoons and does not take the afternoon guanfacine dose. When he was last in clinic late February, we increased his Vyvanse dose from 40 to 60 mg. This was helpful. But things have been worse since spring break. His motivation seems worse. He has a few missing assignments, which he plans to complete. He does the majority of his school work after 6:30 p.m. most evenings. He denies having to do summer school this year. He has plans to take his medication throughout the summer. No plans for summer school.    ROS:  Skin: Improved facial acne. Using Neutrogena wash  MS: Improved wrist pain.   Pysch: mood okay    Past Med / Surg History:  Past Medical History:   Diagnosis Date     Chicken pox      Fracture     collar bone and finger     Osgood-Schlatter's disease 2014    bilaterally      Shin splints 2016    bilaterally     Patient Active Problem List   Diagnosis     ADHD  "(attention deficit hyperactivity disorder)     Primary hypertriglyceridemia     Obesity       Fam / Soc History:  He went to [place] to visit family for spring break.     Family History   Problem Relation Age of Onset     Allergy (severe) Mother      walnut     Carpal tunnel syndrome Maternal Grandfather      Social History     Social History Narrative    Lives with maternal grandparents. They are his official guardian since February 2016.    His mother is a LPN and his step father is in retail. He has one brother and one sister.       Objective:  Vitals: /70 (Patient Site: Right Arm, Patient Position: Sitting, Cuff Size: Adult Large)  Pulse 78  Ht 5' 5.25\" (1.657 m)  Wt (!) 244 lb (110.7 kg)  BMI 40.29 kg/m2  Wt Readings from Last 3 Encounters:   04/24/17 (!) 244 lb (110.7 kg) (>99 %, Z= 2.78)*   02/27/17 (!) 238 lb (108 kg) (>99 %, Z= 2.73)*   11/28/16 (!) 226 lb (102.5 kg) (>99 %, Z= 2.60)*     * Growth percentiles are based on [facility] 2-20 Years data.     PHYSICAL EXAM:  Gen: Alert, well appearing  Eyes: Conjunctivae clear bilaterally. Glasses.   Heart: Regular rate and rhythm; normal S1 and S2; no murmurs, gallops, or rubs.  Lungs: Unlabored respirations; clear breath sounds.  Skin: Improved acne overall on face.   Neuro: Oriented. Appropriate for age.  Psychiatric: Appropriate affect. Seemed somewhat more sullen today.         DATA REVIEWED:  Additional History from Old Records Summarized (2): None  Decision to Obtain Records (1): None  Radiology Tests Summarized or Ordered (1): None  Labs Reviewed or Ordered (1): None  Medicine Test Summarized or Ordered (1): None  Independent Review of EKG, X-RAY, or RAPID STREP (2 each): None    The visit lasted a total of 16 minutes face to face with the patient. Over 50% of the time was spent counseling and educating the patient about ADHD.    Stephani FLETCHER, am scribing for and in the presence of, Dr. Gao.    I, Dr. Gao, personally performed the " services described in this documentation, as scribed by Stephani Daley in my presence, and it is both accurate and complete.    Deanne Gao MD  4/24/2017

## 2021-06-11 NOTE — PROGRESS NOTES
Name: Forrest Kapadia  Age: 17 y.o.  Gender: male  : 2000  Date of Encounter: 2017    ASSESSMENT/PLAN:  1. Back pain - improved - etiology unclear - viral vs muscle spasm  Advised close followup if recurrence of pain  Elevated WBC discussed - will not repeat today as well appearing        Chief Complaint   Patient presents with     Hospital Visit Follow Up       HPI:  Forrest Kapadia is a 17 y.o.  male who presents to the clinic with dad with concerns for [facility] ED visit follow-up for back pain. Symptoms started early on 17 at 2:35 AM when he awoke in pain. He had gone to bed that night at 1:30 am after a late moving and had felt well at that time. He tried to take a warm bath and went back to bed at 4 AM. He ended up groaning in pain which prompted him to visit the  ER at around 6:25 AM. He described pain as surface level on his mid-back, even gentle pressure was very painful. He had a temperature of 100.7 at home and chills. He had headache and nausea without emesis in the ER. He had an elevated white count but otherwise normal labs. He had a negative chest x-ray. His EKG was normal except for sinus tachycardia. His pain is mostly resolved at this time, his back is slightly sensitive to the touch but is otherwise not bothered. He slept well last night. He denies recent cold symptoms or exposure to ticks. He has past history of one similar episode that resolved with sleep.He does not remember any trauma or injury to his back.    ROS:  Gen: As reviewed above.  ENT: No nasal congestion or rhinorrhea. No pharyngitis. No otalgia.  Resp: No SOB, cough or wheezing.  GI: As reviewed above.  : No dysuria  MS: As reviewed above.  Skin: No rashes  Neuro: As reviewed above.    Past Med / Surg History:  Past Medical History:   Diagnosis Date     Chicken pox      Fracture     collar bone and finger     Osgood-Schlatter's disease 2014    bilaterally      Shin splints 2016    bilaterally     Patient Active  Problem List   Diagnosis     ADHD (attention deficit hyperactivity disorder)     Primary hypertriglyceridemia     Obesity       Fam / Soc History:  Today is his year anniversary of working at [employer]. He completed summer school classes for reading and math.    Family History   Problem Relation Age of Onset     Allergy (severe) Mother      walnut     Carpal tunnel syndrome Maternal Grandfather      Social History     Social History Narrative    Lives with maternal grandparents. They are his official guardian since February 2016.    His mother is a LPN and his step father is in retail. He has one brother and one sister.       Objective:  Vitals: /68 (Patient Site: Right Arm, Patient Position: Sitting, Cuff Size: Adult Large)  Pulse 98  Wt (!) 250 lb 1.6 oz (113.4 kg)  Wt Readings from Last 3 Encounters:   07/13/17 (!) 250 lb 1.6 oz (113.4 kg) (>99 %, Z= 2.81)*   07/12/17 (!) 250 lb (113.4 kg) (>99 %, Z= 2.81)*   04/24/17 (!) 244 lb (110.7 kg) (>99 %, Z= 2.78)*     * Growth percentiles are based on CDC 2-20 Years data.       PHYSICAL EXAM:  Gen: Alert, well appearing  ENT: No nasal congestion or rhinorrhea. Oropharynx normal, moist mucosa.  TMs normal bilaterally.  Eyes: Conjunctivae clear bilaterally.   Heart: Regular rate and rhythm; normal S1 and S2; no murmurs, gallops, or rubs.  Lungs: Unlabored respirations; clear breath sounds.  Musculoskeletal: Mild paraspinal tenderness, left greater than right in upper lumbar region. No spinal tenderness, Normal ROM in back and neck without tenderness  Skin: Moderate inflammatory acne.  Neuro: Oriented. Normal tone; no focal deficits appreciated. Appropriate for age. Negative straight leg raise.  Hematologic/Lymph/Immune: No cervical lymphadenopathy.  Psychiatric: Appropriate affect, relaxed and interactive.    Pertinent results / imaging:  Results for orders placed or performed during the hospital encounter of 07/12/17   Influenza A/B Rapid Test   Result  Value Ref Range    Influenza  A, Rapid Antigen No Influenza A antigen detected No Influenza A antigen detected    Influenza B, Rapid Antigen No Influenza B antigen detected No Influenza B antigen detected   Culture, Urine   Result Value Ref Range    Culture No Growth    Basic Metabolic Panel   Result Value Ref Range    Sodium 138 136 - 145 mmol/L    Potassium 3.9 3.5 - 5.0 mmol/L    Chloride 103 98 - 107 mmol/L    CO2 26 22 - 31 mmol/L    Anion Gap, Calculation 9 5 - 18 mmol/L    Glucose 106 70 - 125 mg/dL    Calcium 9.8 8.5 - 10.5 mg/dL    BUN 12 9 - 18 mg/dL    Creatinine 0.83 0.70 - 1.30 mg/dL    GFR MDRD Af Amer  >60 mL/min/1.73m2    GFR MDRD Non Af Amer  >60 mL/min/1.73m2   Lactic Acid   Result Value Ref Range    Lactic Acid 1.3 0.5 - 2.2 mmol/L   Urinalysis-UC if Indicated   Result Value Ref Range    Color, UA Straw Colorless, Yellow, Straw, Light Yellow    Clarity, UA Clear Clear    Glucose, UA Negative Negative    Bilirubin, UA Negative Negative    Ketones, UA Negative Negative    Specific Gravity, UA 1.005 1.001 - 1.030    Blood, UA Negative Negative    pH, UA 8.0 4.5 - 8.0    Protein, UA Negative Negative mg/dL    Urobilinogen, UA <2.0 E.U./dL <2.0 E.U./dL, 2.0 E.U./dL    Nitrite, UA Negative Negative    Leukocytes, UA Large (!) Negative    Bacteria, UA Few (!) None Seen hpf    RBC, UA 0-2 None Seen, 0-2 hpf    WBC, UA 10-25 (!) None Seen, 0-5 hpf    Squam Epithel, UA 5-10 (!) None Seen, 0-5 lpf   Mononucleosis Screen   Result Value Ref Range    Mono Screen Negative Negative   HM1 (CBC with Diff)   Result Value Ref Range    WBC 17.3 (H) 4.5 - 13.0 thou/uL    RBC 5.32 (H) 4.50 - 5.30 mill/uL    Hemoglobin 15.9 13.0 - 16.0 g/dL    Hematocrit 45.9 36.0 - 51.0 %    MCV 86 78 - 98 fL    MCH 29.9 25.0 - 35.0 pg    MCHC 34.6 32.0 - 36.0 g/dL    RDW 12.2 11.5 - 14.0 %    Platelets 226 140 - 440 thou/uL    MPV 10.3 8.5 - 12.5 fL    Neutrophils % 83 (H) 34 - 64 %    Lymphocytes % 8 (L) 25 - 45 %    Monocytes % 9 (H)  3 - 6 %    Eosinophils % 0 0 - 3 %    Basophils % 0 0 - 1 %    Neutrophils Absolute 14.3 (H) 1.5 - 9.5 thou/uL    Lymphocytes Absolute 1.4 1.1 - 6.0 thou/uL    Monocytes Absolute 1.6 (H) 0.1 - 0.8 thou/uL    Eosinophils Absolute 0.0 0.0 - 0.4 thou/uL    Basophils Absolute 0.1 0.0 - 0.1 thou/uL   C-Reactive Protein   Result Value Ref Range    CRP 0.5 0.0 - 0.8 mg/dL   Sedimentation Rate   Result Value Ref Range    Sed Rate 3 0 - 15 mm/hr   Drugs of Abuse 1,Urine (DA1)   Result Value Ref Range    Amphetamines Screen Negative Screen Negative    Benzodiazepines Screen Negative Screen Negative    Opiates Screen Negative Screen Negative    Phencyclidine Screen Negative Screen Negative    THC Screen Negative Screen Negative    Barbiturates Screen Negative Screen Negative    Cocaine Metabolite Screen Negative Screen Negative    Oxycodone Screen Negative Screen Negative    Creatinine, Urine 34.7 mg/dL   ECG 12 lead nursing unit performed   Result Value Ref Range    SYSTOLIC BLOOD PRESSURE 121 mmHg    DIASTOLIC BLOOD PRESSURE 59 mmHg    VENTRICULAR RATE 106 BPM    ATRIAL RATE 106 BPM    P-R INTERVAL 150 ms    QRS DURATION 84 ms    Q-T INTERVAL 312 ms    QTC CALCULATION (BEZET) 414 ms    P Axis 43 degrees    R AXIS 37 degrees    T AXIS 26 degrees    MUSE DIAGNOSIS       Sinus tachycardia  Cannot rule out Anterior infarct , age undetermined  Abnormal ECG  No previous ECGs available  Confirmed by AJ NEWMAN MD LOC:[facility] ([id number]) on 7/12/2017 3:50:16 PM         DATA REVIEWED:  Additional History from Old Records Summarized (2): Reviewed [facility] ER Note from 7/12/2017 regarding back pain and fever.  Decision to Obtain Records (1): None  Radiology Tests Summarized or Ordered (1): CXR 7/12/17  Labs Reviewed or Ordered (1): Labs reviewed.  Medicine Test Summarized or Ordered (1): EKG reviewed  Independent Review of EKG, X-RAY, or RAPID STREP (2 each): None    The visit lasted a total of 19 minutes face to face with the patient. Over 50% of  the time was spent counseling and educating the patient about [facility] ED follow-up.    I, Lela Maurer, am scribing for and in the presence of, Dr. Gao.    I, Deanne Gao MD  Pediatrician  [facility], personally performed the services described in this documentation, as scribed by Lela Maurer in my presence, and it is both accurate and complete.    Total Data Points: 5    Deanne Gao MD  7/14/2017

## 2021-06-12 NOTE — PROGRESS NOTES
Chief Complaint   Patient presents with     Poss UTI     Back pain for 3x days. Pain when urinating and more frequnely than ususl. Started this AM.       HPI    Patient is here for having dysuria with increased urinary frequency noted today, preceded by 3 days of moderate lower back pain. No back injury, fever, chills, gross hematuria. He has hx of UTI 7 yrs ago. He took Ibuprofen with some relief.     ROS: Pertinent ROS noted in HPI.     Allergies   Allergen Reactions     Amoxicillin Nausea And Vomiting and Rash       Patient Active Problem List   Diagnosis     ADHD (attention deficit hyperactivity disorder)     Primary hypertriglyceridemia     Obesity       Family History   Problem Relation Age of Onset     Allergy (severe) Mother      walnut     Carpal tunnel syndrome Maternal Grandfather        Social History     Social History     Marital status: Single     Spouse name: N/A     Number of children: N/A     Years of education: N/A     Occupational History     Not on file.     Social History Main Topics     Smoking status: Never Smoker     Smokeless tobacco: Not on file     Alcohol use Not on file     Drug use: Not on file     Sexual activity: Not on file     Other Topics Concern     Not on file     Social History Narrative    Lives with maternal grandparents. They are his official guardian since February 2016.    His mother is a LPN and his step father is in retail. He has one brother and one sister.         Objective:    Vitals:    07/20/17 1610   BP: 110/60   Pulse: 110   Resp: 18   Temp: 98.5  F (36.9  C)   SpO2: 95%       Gen:NAD  CV: RRR, no M, R, G  Pulm: CTAB  Abd: normal bowel sounds, soft, no tenderness,no HSM/mass. No CVA tenderness.   MSK: normal inspection of back, mild pain across lumbar area including midline.  : normal inspection of penis and scrotum, no significant tenderness to palpation of testicles, no testicular mass.     Recent Results (from the past 24 hour(s))   Urinalysis-UC if Indicated    Result Value Ref Range    Color, UA Yellow Colorless, Yellow, Straw, Light Yellow    Clarity, UA Slightly Cloudy (!) Clear    Glucose, UA Negative Negative    Bilirubin, UA Negative Negative    Ketones, UA Negative Negative    Specific Gravity, UA 1.020 1.005 - 1.030    Blood, UA Small (!) Negative    pH, UA 7.0 5.0 - 8.0    Protein,  mg/dL (!) Negative mg/dL    Urobilinogen, UA 1.0 E.U./dL 0.2 E.U./dL, 1.0 E.U./dL    Nitrite, UA Negative Negative    Leukocytes, UA Moderate (!) Negative    Bacteria, UA None Seen None Seen hpf    RBC, UA 3-5 (!) None Seen, 0-2 hpf    WBC, UA  (!) None Seen, 0-5 hpf    Mucus, UA Few (!) None Seen lpf         UTI (urinary tract infection)  -     sulfamethoxazole-trimethoprim (BACTRIM DS) 800-160 mg per tablet; Take 1 tablet by mouth 2 (two) times a day for 7 days.    Dysuria  -     Urinalysis-UC if Indicated  -     Culture, Urine      Supportive cares and f/u as directed.

## 2021-06-13 NOTE — PROGRESS NOTES
Name: Forrest Kapadia  Age: 17 y.o.  Gender: male  : 2000  Date of Encounter: 10/19/2017    ASSESSMENT/PLAN:  1. ADHD  - guanFACINE (INTUNIV ER) 2 mg Tb24; Take 2 mg by mouth daily.  Dispense: 30 tablet; Refill: 5  - dextroamphetamine-amphetamine (ADDERALL) 5 mg Tab tablet; Take 1 tablet by mouth daily in mid afternoon as needed  Dispense: 30 tablet; Refill: 0  - lisdexamfetamine (VYVANSE) 60 MG capsule; Take 1 capsule (60 mg total) by mouth every morning.  Dispense: 30 capsule; Refill: 0    Will attempt to change to ER guanfacine to improve compliance with afternoon dose - call if not covered  Add in Adderall 5 mg IR after school has needed for home work  3 months of vyvanse faxed  CSA signed - no early refills  Suggested if pills disappearing early, grandparents should maintain control of medication and supervise consumption - or send to school for nurse to administer  Med check within 6 months    2. Insomnia, unspecified type  - traZODone (DESYREL) 50 MG tablet; Take 1 tablet (50 mg total) by mouth at bedtime.  Dispense: 90 tablet; Refill: 0      Orders Placed This Encounter   Procedures     Meningococcal MCV4P     Order Specific Question:   Counseling provided to include answering patients questions and/or preemptively discussing the risks and benefits of all components.     Answer:   Yes     Influenza, Seasonal Quad, Preservative Free 36+ Months     Order Specific Question:   Counseling provided to include answering patients questions and/or preemptively discussing the risks and benefits of all components.     Answer:   Yes         Chief Complaint   Patient presents with     Medication Refill     Vyvanse       HPI:  Forrest Kapadia is a 17 y.o.  male who presents to the clinic for a follow up on ADHD. He is accompanied by his grandma. He is currently of Vyvanse 60 mg daily. Grandma fills a pill dispenser for him every . He ran out of Vyvanse early this month and the family is unsure of why. He  did not take double the pills. He thinks the vyvanse is working well. He has been on this dose since last spring. He is taking the guanfacine two times per day - he takes the afternoon dose Monday -Thursday typically. He has not been taking the adderall 5 mg IR tablets after school. He is usually consistent with his medication on the weekends. He is failing two classes and grandma is receiving messages from one of his teachers.He has 2 Ds as well. He does not always turn in his assignments. He has homework in about three of his classes each night.     ROS:  Gen: No fever   GI: Grandma has been giving him fiber pills three times a week. He spends a lot of time in the bathroom and does take Tums  Neuro: see HPI  Psych:  UYEN-7 Screening Results:  Feeling nervous, anxious or on edge: 1  Not being able to stop or control worry: 0  Worrying too much about different things: 1  Trouble relaxin  Being so restless that is is hard to sit still: 0  Becoming easily annnoyed or irritable: 1  Feeling afraid as if something awful might happen: 1  UYEN-7 Total: 4  How difficult did these problems make it for you to do your work, take care of things at home or get along with other people? : Not difficult at all   Little interest or pleasure in doing things: More than half the days  Feeling down, depressed, or hopeless: Not at all  Trouble falling or staying asleep, or sleeping too much: Not at all  Feeling tired or having little energy: Several days  Poor appetite or overeating: Not at all  Feeling bad about yourself - or that you are a failure or have let yourself or your family down: Not at all  Trouble concentrating on things, such as reading the newspaper or watching television: Several days  Moving or speaking so slowly that other people could have noticed. Or the opposite - being so fidgety or restless that you have been moving around a lot more than usual: Not at all  Thoughts that you would be better off dead, or of  "hurting yourself in some way: Not at all  PHQ-9 Total Score: 4  If you checked off any problems, how difficult have these problems made it for you to do your work, take care of things at home, or get along with other people?: Not difficult at all  Depression Follow-up Plan: patient follow-up to return when and if necessary  See pertinent positives in the HPI.       Past Med / Surg History:  Past Medical History:   Diagnosis Date     Chicken pox      Fracture     collar bone and finger     Osgood-Schlatter's disease 2014    bilaterally      Shin splints 2016    bilaterally     History reviewed. No pertinent surgical history.    Fam / Soc History:  Going to [employer] for [employer]  Works at [employer]  10th grade  Family History   Problem Relation Age of Onset     Allergy (severe) Mother      walnut     Carpal tunnel syndrome Maternal Grandfather      Social History     Social History Narrative    Lives with maternal grandparents. They are his official guardian since February 2016.    His mother is a LPN and his step father is in retail. He has one brother and one sister.       Objective:  Vitals: /76 (Patient Site: Right Arm, Patient Position: Sitting, Cuff Size: Adult Large)  Pulse 68  Ht 5' 6.5\" (1.689 m)  Wt (!) 248 lb 14.4 oz (112.9 kg)  BMI 39.57 kg/m2  Wt Readings from Last 3 Encounters:   10/19/17 (!) 248 lb 14.4 oz (112.9 kg) (>99 %, Z= 2.74)*   07/20/17 (!) 249 lb (112.9 kg) (>99 %, Z= 2.79)*   07/13/17 (!) 250 lb 1.6 oz (113.4 kg) (>99 %, Z= 2.81)*     * Growth percentiles are based on CDC 2-20 Years data.       PHYSICAL EXAM:  Gen: Alert, well appearing, obese  Eyes: Conjunctivae clear bilaterally.   Heart: Regular rate and rhythm; normal S1 and S2; no murmurs, gallops, or rubs.  Lungs: Unlabored respirations; clear breath sounds.  Neuro: Oriented. Slightly inattentive at times during conversation.   Psychiatric: Appropriate affect. minimal oppositional with grandmother (respectful)    DATA " REVIEWED:  Additional History from Old Records Summarized (2): 7/2017 visit for urinary symptoms  Decision to Obtain Records (1): None  Radiology Tests Summarized or Ordered (1): None  Labs Reviewed or Ordered (1): None  Medicine Test Summarized or Ordered (1): PHQ-9 administered. 10/19/2017.   Independent Review of EKG, X-RAY, or RAPID STREP (2 each): None  Total Data Points: 3      The visit lasted a total of 15 minutes face to face with the patient. Over 50% of the time was spent counseling and educating the patient about his ADHD.    I, Amber Cardona, am scribing for and in the presence of, Dr. Gao.    I, Dr. Gao, personally performed the services described in this documentation, as scribed by Amber Cardona in my presence, and it is both accurate and complete.      Deanne Gao MD  10/19/2017

## 2021-06-17 NOTE — PATIENT INSTRUCTIONS - HE
"Patient Instructions by Sunil Rouse PA-C at 12/6/2019  4:10 PM     Author: Sunil Rouse PA-C Service: -- Author Type: Physician Assistant    Filed: 12/6/2019  6:05 PM Encounter Date: 12/6/2019 Status: Signed    : Sunil Rouse PA-C (Physician Assistant)       Patient Education     Understanding Meniscal Tears    The meniscus is a tough cushion of fibrous tissue called cartilage in the knee joint. It cushions the knee. It absorbs shock and helps spread weight across the knee joint. It also works with other parts of the knee to help keep the joint stable. Injury or aging can cause the meniscus to tear and lead to pain and problems using the knee.   What causes meniscal tears?  A sudden traumatic injury can tear the meniscus. This is often because of planting the foot and twisting the knee. You might feel a \"pop\" when the injury happens. Sports such as soccer, football, and basketball are often involved. Repeated actions, such as squatting, may also lead to a tear. Breakdown of the meniscus because of aging can also lead to tears. Different types of common tears are bucket handle, flap, and radial tears.  Symptoms of meniscal tears  These can include:    Knee pain or locking    Knee swelling    Catching of the knee or inability to straighten the knee    Unstable feeling in the knee    Unable to move the knee through its full range of motion  Treatment for meniscal tears  A tear is unlikely to heal on its own. You often will need surgery to repair or remove a tear. In many cases, your healthcare provider will first try treatments to help relieve symptoms. These may include:    Rest the knee. This means avoiding any activity that puts stress on the knee joint. These include kneeling, squatting, jogging, and climbing stairs. In some cases, you may need to use crutches for a time to keep body weight off of the knee joint.    Cold pack. Putting a cold pack on the knee helps reduce pain and " swelling.    Knee brace. Bracing the knee helps support it.    Medicine. Prescription and over-the-counter medicines can help relieve swelling and pain.    Exercises. Exercises help strengthen the muscles of the leg to help support the knee joint.  If these treatments dont help relieve symptoms or the injury is severe, you may need surgery. This can remove or repair the meniscus to relieve symptoms and restore movement.  When to call your healthcare provider  Call your healthcare provider right away if you have any of these:    Fever of 100.4 F (38 C) or higher, or as directed by your provider    Chills    Pain or swelling that gets worse, including pain in the calf    Numbness or tingling in leg or foot    You suddenly cant put any weight on your leg    Your knee locks  Date Last Reviewed: 3/10/2016    9132-5885 [employer]. [address] [place] [address], [place] [address]. All rights reserved. This information is not intended as a substitute for professional medical care. Always follow your healthcare professional's instructions.

## 2021-06-17 NOTE — PROGRESS NOTES
Name: Forrest Kapadia  Age: 17 y.o.  Gender: male  : 2000  Date of Encounter: 2018    ASSESSMENT/PLAN:  1. ADHD (attention deficit hyperactivity disorder)  - lisdexamfetamine (VYVANSE) 60 MG capsule; Take 1 capsule (60 mg total) by mouth every morning.  Dispense: 30 capsule; Refill: 0  - lisdexamfetamine (VYVANSE) 60 MG capsule; Take 1 capsule (60 mg total) by mouth every morning.  Dispense: 90 capsule; Refill: 0  - dextroamphetamine-amphetamine (ADDERALL) 5 mg Tab tablet; Take 1 tablet by mouth daily in mid afternoon as needed  Dispense: 90 tablet; Refill: 0    Faxed 1 month of vyvanse and 1 month of Adderall to local pharmacy  Sent 90 day supply of each to mail order pharmacy    2. ADHD  - guanFACINE (INTUNIV ER) 2 mg Tb24; Take 2 mg by mouth daily.  Dispense: 90 tablet; Refill: 1  Sent to mail order pharmacy    3. Insomnia, unspecified type  - traZODone (DESYREL) 50 MG tablet; Take 1 tablet (50 mg total) by mouth at bedtime.  Dispense: 90 tablet; Refill: 1  Sent to mail order pharmacy    4. Nasal congestion  ? Allergies - discussed zyrtec/clairitin/flonase  Reviewed option of allergy testing    5. Primary hypertriglyceridemia  Needs repeat fasting lipids in future    Med check within 6 months  Overdue for wellness visit      Chief Complaint   Patient presents with     ADHD     current dose is working well       HPI:  Forrest Kapadia is a 17 y.o.  male who presents to the clinic for an ADHD medication consultation, accompanied by his grandfather. He was last seen in clinic on 10/19/17. They think his medication regimen is working overall fairly well. He takes Vyvase 60 mg and guanfacine ER 2 mg each morning. He doses the 5 mg IR adderall at 3:00 PM most days, although sometimes skips this on the weekends. He takes 50 mg trazodone in the evening consistently. He is able to sleep well as long as he remembers to dose the trazodone. He notes that his grades are improving, with most of his grades being  "B's and C's. He struggles with biology, which he has a D in. His grandfather notes that his grades have really improved from last semester, and he has had more help from grandma and teachers. An increase in motivation and the afternoon 5 mg adderall dose have been helpful. He also charms the teachers. He does not have behavioral issues at school. At this time, he does not believe he will have to attend summer school.       ROS:  ENT: Positive for nasal congestion for 3 days. Repetitive sneezing began on 4/21. Trying some OTC allergy med with decongestant  Resp: No cough.   Weight management: He plans to walk to work this summer. Grandfather notes that he snacks too much, and his grandparents are unable to monitor his consumption all of the time while he is home. He was seen at the [facility] on 11/08/16.   See pertinent positives in the HPI.   Acne - no longer using any products - he is happy with appearance    Past Med / Surg History:  Past Medical History:   Diagnosis Date     Chicken pox      Fracture     collar bone and finger     Osgood-Schlatter's disease 2014    bilaterally      Shin splints 2016    bilaterally     Past Surgical History:   Procedure Laterality Date     NO PAST SURGERIES         Fam / Soc History:  Social: He works at a [employer] place for six hours weekly. He will increase hours a lot this summer  He is in 10th grade  Family History   Problem Relation Age of Onset     Allergy (severe) Mother      walnut     Carpal tunnel syndrome Maternal Grandfather      Social History     Social History Narrative    Lives with maternal grandparents. They are his official guardian since February 2016.    His mother is a LPN and his step father is in retail. He has one brother and one sister.     Objective:  Vitals: /72 (Patient Site: Right Arm, Patient Position: Sitting, Cuff Size: Adult Large)  Pulse 80  Ht 5' 6\" (1.676 m)  Wt (!) 264 lb 4.8 oz (119.9 kg) "  BMI 42.66 kg/m2  Wt Readings from Last 3 Encounters:   04/24/18 (!) 264 lb 4.8 oz (119.9 kg) (>99 %, Z= 2.83)*   10/19/17 (!) 248 lb 14.4 oz (112.9 kg) (>99 %, Z= 2.74)*   07/20/17 (!) 249 lb (112.9 kg) (>99 %, Z= 2.79)*     * Growth percentiles are based on [facility] 2-20 Years data.       PHYSICAL EXAM:  Gen: Alert, well appearing obese  ENT: Nasal congestion. No rhinorrhea. Oropharynx normal, moist mucosa.  TMs normal bilaterally.  Eyes: Conjunctivae clear bilaterally.   Heart: Regular rate and rhythm; normal S1 and S2; no murmurs, gallops, or rubs.  Lungs: Unlabored respirations; clear breath sounds.  Skin: Mild inflammatory facial acne.   Neuro: Oriented. Appropriate for age.  Psych: pleasant      DATA REVIEWED:  Additional History from Old Records Summarized (2): Reviewed 11/08/16 note regarding visit to pediatric weight management clinic.   Decision to Obtain Records (1): None  Radiology Tests Summarized or Ordered (1): None  Labs Reviewed or Ordered (1): Ordered lipid panel.   Medicine Test Summarized or Ordered (1): None  Independent Review of EKG, X-RAY, or RAPID STREP (2 each): None  Total Data Points: 3.     The visit lasted a total of 22 minutes face to face with the patient. Over 50% of the time was spent counseling and educating the patient about his ADHD medication.    IAmber, am scribing for and in the presence of, Dr. Deanne Gao.    I, Dr. Gao, personally performed the services described in this documentation, as scribed by Amber Cardona in my presence, and it is both accurate and complete.      Deanne Gao MD  4/24/2018

## 2021-06-21 NOTE — PROGRESS NOTES
Name: Forrest Kapadia  Age: 18 y.o.  Gender: male  : 2000  Date of Encounter: 10/23/2018    ASSESSMENT/PLAN:   ADHD (attention deficit hyperactivity disorder) - combined type - doing well per self-report  - lisdexamfetamine (VYVANSE) 60 MG capsule; Take 1 capsule (60 mg total) by mouth every morning.  Dispense: 30 capsule; Refill: 0  - lisdexamfetamine (VYVANSE) 60 MG capsule; Take 1 capsule (60 mg total) by mouth daily.  Dispense: 30 capsule; Refill: 0  - dextroamphetamine-amphetamine (ADDERALL) 5 mg Tab tablet; Take 1 tablet by mouth daily in mid afternoon as needed  Dispense: 90 tablet; Refill: 0  - guanFACINE (INTUNIV ER) 2 mg Tb24; Take 2 mg by mouth daily.  Dispense: 90 tablet; Refill: 1    2 months of vyvanse sent to local pharmacy as last script dated 10/21  90 day of adderall IR 5 mg and guanfacine ER sent to mail order pharmacy  Med check within 6 months     Insomnia, unspecified type  - traZODone (DESYREL) 50 MG tablet; Take 1 tablet (50 mg total) by mouth at bedtime.  Dispense: 90 tablet; Refill: 1    Primary hypertriglyceridemia  Advised return for fasting labs: lipid panel, glucose, A1C, ALT and vitamin D  Reviewed diet/exercise        Chief Complaint   Patient presents with     ADHD       HPI:  Forrest Kapadia is a 18 y.o.  male who presents to the clinic for an ADHD medication check. He was last seen in clinic 18, at which time he was tolerating his vyvanse, guanfacine, adderall and trazodone doses well. He continues to tolerate these doses well without adverse side effects. He doses the vyvanse 60 mg and guanfacine ER 2 mg in the morning. He doses adderall IR  5 mg tablet when he gets home from school for support with homework as needed. He doses trazodone 50 mg each night. He is a raghav in high school. He attends the same school as last year. He completes his assignments and is happy with his grades. He is happy at home with his grandparents. He plans to live with his grandparents  "after he graduates from high school and pursue a technical program such as carpentry. He works at a [employer] shop for about 10 hours each week. He is working on getting his 's license but still needs to take behind the well classes.    Weight: He is working on controlling portion sizes and eating nutritional foods.     Health Maintenance: He has had an influenza vaccine this year.     ROS:  See pertinent positives in the HPI.     Past Med / Surg History:  Past Medical History:   Diagnosis Date     Chicken pox      Fracture     collar bone and finger     Osgood-Schlatter's disease 2014    bilaterally      Shin splints 2016    bilaterally     Past Surgical History:   Procedure Laterality Date     NO PAST SURGERIES         Fam / Soc History:  Family History   Problem Relation Age of Onset     Allergy (severe) Mother      walnut     Carpal tunnel syndrome Maternal Grandfather      Social History     Social History Narrative    Lives with maternal grandparents. They are his official guardian since February 2016.    His mother is a LPN and his step father is in retail. He has one brother and one sister.       Objective:  Vitals: /62 (Patient Site: Right Arm, Patient Position: Sitting, Cuff Size: Adult Large)  Pulse 84  Ht 5' 6\" (1.676 m)  Wt (!) 268 lb 1.6 oz (121.6 kg)  BMI 43.27 kg/m2  Wt Readings from Last 3 Encounters:   10/23/18 (!) 268 lb 1.6 oz (121.6 kg) (>99 %, Z= 2.79)*   04/24/18 (!) 264 lb 4.8 oz (119.9 kg) (>99 %, Z= 2.83)*   10/19/17 (!) 248 lb 14.4 oz (112.9 kg) (>99 %, Z= 2.74)*     * Growth percentiles are based on CDC 2-20 Years data.       PHYSICAL EXAM:  Gen: Alert, well appearing. obese  Heart: Regular rate and rhythm; normal S1 and S2; no murmurs, gallops, or rubs.  Lungs: Unlabored respirations; clear breath sounds.  Neuro: Oriented. Appropriate for age.  Psychiatric: Appropriate affect.       DATA REVIEWED:  Additional History from Old Records Summarized (2): None  Decision to Obtain " Records (1): None  Radiology Tests Summarized or Ordered (1): None  Labs Reviewed or Ordered (1): reviewed lipid panel 2016 (abnormal) and ordered fasting labs  Medicine Test Summarized or Ordered (1): None  Independent Review of EKG, X-RAY, or RAPID STREP (2 each): None  Total Data Points: 1.     The visit lasted a total of 11 minutes face to face with the patient. Over 50% of the time was spent counseling and educating the patient about his ADHD medication.    I, Amber Cardona, am scribing for and in the presence of, Dr. Gao.    I, Dr. Gao, personally performed the services described in this documentation, as scribed by Amber Cardona in my presence, and it is both accurate and complete.      Deanne Gao MD  10/23/2018

## 2021-06-23 NOTE — TELEPHONE ENCOUNTER
3 months have been sent to the pharmacy - can  today. Next fill dates will be 2/28, 3/30 - can contact the pharmacy when those are needed. We will plan to see him in April at his scheduled med check.    Sorry about the refill confusion. We can only send 3 months at a time to the pharmacy, so every 3 months, we do need to be notified by the family to send more refills.

## 2021-06-23 NOTE — TELEPHONE ENCOUNTER
Patient and his grandparents (Ian and Miguelito) stated patient is out. Caller was under the impression that Deanne Gao MD would have scripts at the pharmacy until patient is due for an appointment. Caller was not aware of the refill turn around time. Caller stated he expected an Rx to be at the pharmacy and because of that reasoning, they waited until the last minute to get the refill.    Controlled Substance Refill Request  Medication Name:   Requested Prescriptions     Pending Prescriptions Disp Refills     lisdexamfetamine (VYVANSE) 60 MG capsule 30 capsule 0     Sig: Take 1 capsule (60 mg total) by mouth every morning.     Date Last Fill: 12/20/18  Pharmacy: [facility]      Submit electronically to pharmacy  Controlled Substance Agreement Date Scanned:   Encounter-Level CSA Scan Date - 10/19/2017:    Scan on 10/23/2017 11:58 AM (below)         Last office visit with prescriber/PCP: 10/23/2018 Deanne Gao MD OR same dept: 10/23/2018 Deanne Gao MD OR same specialty: 10/23/2018 Deanne Gao MD  Last physical: 7/14/2016 Last MTM visit: Visit date not found

## 2021-06-28 NOTE — PROGRESS NOTES
"Progress Notes by Sunil Rouse PA-C at 12/6/2019  4:10 PM     Author: Sunil Rouse PA-C Service: -- Author Type: Physician Assistant    Filed: 12/6/2019  7:21 PM Encounter Date: 12/6/2019 Status: Addendum    : Sunil Rouse PA-C (Physician Assistant)    Related Notes: Original Note by Sunil Rouse PA-C (Physician Assistant) filed at 12/6/2019  7:20 PM         Assessment:       1. Injury of meniscus of right knee, initial encounter  XR Knee Right 1 or 2 VWS    Crutches Standard   2. Sprain of metacarpophalangeal (MCP) joint of left little finger, initial encounter  XR Hand Left 3 or More VWS     Medical Decision Making  Patient presents with 2 injuries pertaining to the left hand and the right knee.  Initial concern for fracture in the left hand fifth metacarpal to proximal phalange.  Left hand x-ray was negative for acute fracture.  Following x-ray, had patient flex and extend all left hand joints.  He was able to do this, but did note pain particularly while flexing the fifth MCP joint.  Given that range of motion is intact, no signs of acute tendon rupture.  Patient's right knee pain that initially presented with a \"pop\" and now ongoing instability of the right knee is concerning for a tear of the meniscus.  Also had initial concerns for fracture which was ruled out with a negative knee x-ray and a ligament tear, patient has no joint laxity during exam.      Plan:       Provided crutches.  Provided a finger splint.  Rest, ice, compression, and elevation.  Over-the-counter analgesics discussed.  Recommended patient follow-up within 72 hours with [facility] for further evaluation of suspected meniscus injury.      Patient Instructions   Patient Education     Understanding Meniscal Tears    The meniscus is a tough cushion of fibrous tissue called cartilage in the knee joint. It cushions the knee. It absorbs shock and helps spread weight across the knee joint. It also works " "with other parts of the knee to help keep the joint stable. Injury or aging can cause the meniscus to tear and lead to pain and problems using the knee.   What causes meniscal tears?  A sudden traumatic injury can tear the meniscus. This is often because of planting the foot and twisting the knee. You might feel a \"pop\" when the injury happens. Sports such as soccer, football, and basketball are often involved. Repeated actions, such as squatting, may also lead to a tear. Breakdown of the meniscus because of aging can also lead to tears. Different types of common tears are bucket handle, flap, and radial tears.  Symptoms of meniscal tears  These can include:    Knee pain or locking    Knee swelling    Catching of the knee or inability to straighten the knee    Unstable feeling in the knee    Unable to move the knee through its full range of motion  Treatment for meniscal tears  A tear is unlikely to heal on its own. You often will need surgery to repair or remove a tear. In many cases, your healthcare provider will first try treatments to help relieve symptoms. These may include:    Rest the knee. This means avoiding any activity that puts stress on the knee joint. These include kneeling, squatting, jogging, and climbing stairs. In some cases, you may need to use crutches for a time to keep body weight off of the knee joint.    Cold pack. Putting a cold pack on the knee helps reduce pain and swelling.    Knee brace. Bracing the knee helps support it.    Medicine. Prescription and over-the-counter medicines can help relieve swelling and pain.    Exercises. Exercises help strengthen the muscles of the leg to help support the knee joint.  If these treatments dont help relieve symptoms or the injury is severe, you may need surgery. This can remove or repair the meniscus to relieve symptoms and restore movement.  When to call your healthcare provider  Call your healthcare provider right away if you have any of " these:    Fever of 100.4 F (38 C) or higher, or as directed by your provider    Chills    Pain or swelling that gets worse, including pain in the calf    Numbness or tingling in leg or foot    You suddenly cant put any weight on your leg    Your knee locks  Date Last Reviewed: 3/10/2016    9205-4392 [employer]. [address] [place] [address]. All rights reserved. This information is not intended as a substitute for professional medical care. Always follow your healthcare professional's instructions.             Subjective:       Forrest Kapadia is a 19 y.o. male here for evaluation of two separate injuries, one pertaining to the left hand the other pertaining to the right knee.  Patient suffered both injuries during gym class in high school.  He first notes catching his left hand fifth finger in a hole and lifting his hand causing the fifth finger to extend laterally and hyper abduct.  Since then he has had some numbness/tingling, has difficulty flexing at the fifth MCP joint, and ecchymosis.  Patient then injured his knee earlier today.  Patient was playing basketball when he jumped and came down on the right knee with all of his weight causing it to hyperextend.  Patient states that he and others around him could hear a pop.  Since then patient feels like the knee is unstable and going to give out and has difficulty bearing weight and walking.    The following portions of the patient's history were reviewed and updated as appropriate: allergies, current medications and problem list.    Review of Systems  Pertinent items are noted in HPI.     Allergies  Allergies   Allergen Reactions   ? Amoxicillin Nausea And Vomiting and Rash       Family History   Problem Relation Age of Onset   ? Allergy (severe) Mother         walnut   ? Carpal tunnel syndrome Maternal Grandfather        Social History     Socioeconomic History   ? Marital status: Single     Spouse name: None   ? Number of children:  None   ? Years of education: None   ? Highest education level: None   Occupational History   ? None   Social Needs   ? Financial resource strain: None   ? Food insecurity:     Worry: None     Inability: None   ? Transportation needs:     Medical: None     Non-medical: None   Tobacco Use   ? Smoking status: Never Smoker   ? Smokeless tobacco: Never Used   Substance and Sexual Activity   ? Alcohol use: None   ? Drug use: None   ? Sexual activity: None   Lifestyle   ? Physical activity:     Days per week: None     Minutes per session: None   ? Stress: None   Relationships   ? Social connections:     Talks on phone: None     Gets together: None     Attends [employer] service: None     Active member of club or organization: None     Attends meetings of clubs or organizations: None     Relationship status: None   ? Intimate partner violence:     Fear of current or ex partner: None     Emotionally abused: None     Physically abused: None     Forced sexual activity: None   Other Topics Concern   ? None   Social History Narrative    Lives with maternal grandparents. They are his official guardian since February 2016.    His mother is a LPN and his step father is in retail. He has one brother and one sister.         Objective:       /80   Pulse 94   Temp 97  F (36.1  C) (Oral)   Resp 19   Wt (!) 274 lb (124.3 kg)   SpO2 97%   BMI 44.04 kg/m    General appearance: alert, appears stated age, cooperative, no distress and non-toxic  Extremities: Right knee: Tenderness to palpation of anterior lateral knee, pain with full flexion of the right knee, no joint laxity but has increased pain with anterior drawer test.  Left hand fifth finger: Tenderness over the proximal phalange, fifth MCP joint, and fifth metacarpal; patient has reduced flexion through the fifth MCP joint  Pulses: 2+ and symmetric  Skin: Ecchymosis and swelling over the left hand fifth metacarpal, no swelling or erythema over the right knee  Neurologic:  Sensation light touch intact and symmetrical throughout the extremities    Imaging    Xr Hand Left 3 Or More Vws    Result Date: 12/6/2019  EXAM: XR HAND LEFT 3 OR MORE VWS LOCATION: [facility] DATE/TIME: 12/6/2019 5:43 PM INDICATION: 5th finger was hyperabducted laterally with pain and bruising over the 5th proximal phalange, MCP joint, and 5th metacarpal COMPARISON: None.     Medial soft tissue swelling. No fracture or dislocation.    Xr Knee Right 1 Or 2 Vws    Result Date: 12/6/2019  EXAM: XR KNEE RIGHT 1 OR 2 VWS LOCATION: [facility] DATE/TIME: 12/6/2019 5:43 PM INDICATION: Patient hyperextending the knee with all his weight coming down; pain over the anterior knee; rule out fracture COMPARISON: None.     Normal right knee joint spaces and alignment. No fracture or joint effusion.    I personally reviewed the results, which showed no acute signs of fracture or dislocation. Discussed findings with the patient.

## 2021-07-03 NOTE — ADDENDUM NOTE
Addendum Note by Rosita Zapata MD at 6/4/2020  4:10 PM     Author: Rosita Zapata MD Service: -- Author Type: Physician    Filed: 6/4/2020  4:10 PM Encounter Date: 6/4/2020 Status: Signed    : Rosita Zapata MD (Physician)    Addended by: ROSITA ZAPATA on: 6/4/2020 04:10 PM        Modules accepted: Orders

## 2021-07-03 NOTE — ADDENDUM NOTE
Addendum Note by Azael Gao MD at 4/22/2020  3:18 PM     Author: Azael Gao MD Service: -- Author Type: Physician    Filed: 4/22/2020  3:18 PM Encounter Date: 4/21/2020 Status: Signed    : Azael Gao MD (Physician)    Addended by: AZAEL GAO on: 4/22/2020 03:18 PM        Modules accepted: Orders

## 2021-07-03 NOTE — ADDENDUM NOTE
Addendum Note by Azael Gao MD at 7/25/2017  2:53 PM     Author: Azael Gao MD Service: -- Author Type: Physician    Filed: 7/25/2017  2:53 PM Encounter Date: 7/25/2017 Status: Signed    : Azael Gao MD (Physician)    Addended by: AZAEL GAO on: 7/25/2017 02:53 PM        Modules accepted: Orders

## 2021-07-03 NOTE — ADDENDUM NOTE
Addendum Note by Helen Rubi CMA at 6/4/2020  3:28 PM     Author: Helen Rubi CMA Service: -- Author Type: Certified Medical Assistant    Filed: 6/4/2020  3:28 PM Encounter Date: 6/4/2020 Status: Signed    : Helen Rubi CMA (Certified Medical Assistant)    Addended by: HELEN RUBI on: 6/4/2020 03:28 PM        Modules accepted: Orders